# Patient Record
(demographics unavailable — no encounter records)

---

## 2025-01-06 NOTE — HISTORY OF PRESENT ILLNESS
Waverly Comfort is a 76year old female who is here for establishing care. She has concerns with tremor in left hand. It disappears when she is writing. She does get shakier if holding other things. She is right handed. She needs printed Rx for all her meds/supplements to get reimbursed by her [employer]  She is fasting for labs today. Has history of elevated total chol, but HDL very high at 94. LDL was elevated at 140. Last checked 2/2018. Last MWV per patient 1 year ago; she is not interested in returning for one. She does have history of osteoporosis-only taking calcium and vitamin D. Last DEXA scan 2016. She denies any chest pains, dyspnea, leg swelling. No abdominal pains, heartburn, or bowel habit changes. I have reviewed and updated this patient's allergies, medications, problem list, social history, and family history today. Current Outpatient Medications   Medication Sig   â¢ ciclopirox (PENLAC) 8 % topical solution Apply topically nightly. â¢ Biotin 5 MG Tab Take 5 mg by mouth daily. â¢ Flaxseed, Linseed, (FLAX SEED OIL) 1000 MG capsule Take 1,200 mg by mouth daily. â¢ fluticasone (FLONASE) 50 MCG/ACT nasal spray Spray 1 spray in each nostril daily. â¢ Glucosamine-Chondroit-Vit C-Mn (GLUCOSAMINE CHONDR 1500 COMPLX) tablet Take 1 tablet by mouth 2 times daily. â¢ loratadine (CLARITIN) 10 MG tablet Take 1 tablet by mouth as needed for Allergies. â¢ Omega-3 Fatty Acids (FISH OIL) 1000 MG capsule Take 1,200 mg by mouth daily. â¢ polyethylene glycol-propylene glycol (SYSTANE) 0.4-0.3 % Solution Apply 1 drop to eye as needed (dry eyes). â¢ Calcium Carbonate-Vit D-Min (RA CALCIUM/MINERALS/VITAMIN D) 600-400 MG-UNIT Tab Take 1 tablet by mouth daily. â¢ Multiple Vitamin (DAILY VALUE MULTIVITAMIN) Tab Take 1 tablet by mouth daily. â¢ Multiple Vitamins-Minerals (AIRBORNE) Chew Tab Chew 1 tablet by mouth daily as needed (for viral illness).      No current facility-administered medications for [Gradual] : gradual "this visit. Review of system:   see HPI; otherwise denies HEENT, NECK, RESP, CARDIAC, GI, , NEURO or PSYCH Sx    Physical Exam:  Visit Vitals  /62 (BP Location: RUE, Patient Position: Sitting, Cuff Size: Regular)   Pulse 90   Resp 12   Ht 5' 5"" (1.651 m)   Wt 56.9 kg   BMI 20.87 kg/mÂ²     General appearance: alert, appears stated age, cooperative and no distress  Head: Normocephalic, without obvious abnormality, atraumatic  Neck: no adenopathy, no carotid bruit, no JVD and supple, symmetrical, trachea midline  Lungs: clear to auscultation bilaterally  Heart: regular rate and rhythm, S1, S2 normal, no murmur, click, rub or gallop  Abdomen: soft, non-tender; bowel sounds normal; no masses,  no organomegaly  Extremities: no edema, redness or tenderness in the calves or thighs  Neurologic: Alert and oriented x3, normal strength and tone. Normal symmetric reflexes. Normal coordination and gait  Psych:  Affect is normal    Labs:No in-office labs done    Assessment/Plan:    Lupis Delcid was seen today for establish care. Diagnoses and all orders for this visit:    Encounter to establish care    Allergic rhinitis due to pollen, unspecified seasonality  -     fluticasone (FLONASE) 50 MCG/ACT nasal spray; Spray 1 spray in each nostril daily. -     loratadine (CLARITIN) 10 MG tablet; Take 1 tablet by mouth as needed for Allergies. Hair thinning  -     Biotin 5 MG Tab; Take 5 mg by mouth daily. Age-related osteoporosis without current pathological fracture  -     Calcium Carbonate-Vit D-Min (RA CALCIUM/MINERALS/VITAMIN D) 600-400 MG-UNIT Tab; Take 1 tablet by mouth daily. Hyperlipidemia LDL goal <130- lab orders placed for fasting labs  -     Discontinue: Omega-3 Fatty Acids (FISH OIL) 1000 MG capsule; Take 1,200 mg by mouth daily.  -     Flaxseed, Linseed, (FLAX SEED OIL) 1000 MG capsule; Take 1,200 mg by mouth daily.  -     Omega-3 Fatty Acids (FISH OIL) 1000 MG capsule;  Take 1,200 mg by mouth " [10] : 10 [7] : 7 daily.  -     LIPID PANEL WITH REFLEX; Future  -     COMPREHENSIVE METABOLIC PANEL; Future    Bilateral dry eyes  -     polyethylene glycol-propylene glycol (SYSTANE) 0.4-0.3 % Solution; Apply 1 drop to eye as needed (dry eyes). Arthritis involving multiple sites  -     Glucosamine-Chondroit-Vit C-Mn (GLUCOSAMINE CHONDR 1500 COMPLX) tablet; Take 1 tablet by mouth 2 times daily. Encounter for general health examination  -     Multiple Vitamin (DAILY VALUE MULTIVITAMIN) Tab; Take 1 tablet by mouth daily.  -     Multiple Vitamins-Minerals (AIRBORNE) Chew Tab; Chew 1 tablet by mouth daily as needed (for viral illness). Body mass index is 20.87 kg/mÂ². BMI ASSESSMENT/PLAN:  Patient BMI is within normal range. Return in about 1 year (around 3/4/2020) for medication check. Printed prescriptions given for all supplements; 90 day supply with 3 refills.   Dates valid 1/1/19-12/31/19 [Dull/Aching] : dull/aching [Localized] : localized [Stabbing] : stabbing [] : yes [Constant] : constant [Sleep] : sleep [Nothing helps with pain getting better] : Nothing helps with pain getting better [Full time] : Work status: full time

## 2025-01-06 NOTE — PROCEDURE
[Medium Joint Injection] : medium joint injection [Right] : of the right [Other: ____] : [unfilled] [Pain] : pain [Inflammation] : inflammation [Alcohol] : alcohol [Ethyl Chloride sprayed topically] : ethyl chloride sprayed topically [Sterile technique used] : sterile technique used [___ cc    10mg] : Triamcinolone (Kenalog) ~Vcc of 10 mg  [] : Patient tolerated procedure well [Risks, benefits, alternatives discussed / Verbal consent obtained] : the risks benefits, and alternatives have been discussed, and verbal consent was obtained [___ cc    1%] : Lidocaine ~Vcc of 1%